# Patient Record
(demographics unavailable — no encounter records)

---

## 2025-01-28 NOTE — PHYSICAL EXAM
[No Acute Distress] : no acute distress [Well Nourished] : well nourished [Well Developed] : well developed [Well-Appearing] : well-appearing [Normal Sclera/Conjunctiva] : normal sclera/conjunctiva [PERRL] : pupils equal round and reactive to light [EOMI] : extraocular movements intact [Normal Outer Ear/Nose] : the outer ears and nose were normal in appearance [Normal Oropharynx] : the oropharynx was normal [No JVD] : no jugular venous distention [No Lymphadenopathy] : no lymphadenopathy [Supple] : supple [Thyroid Normal, No Nodules] : the thyroid was normal and there were no nodules present [No Respiratory Distress] : no respiratory distress  [No Accessory Muscle Use] : no accessory muscle use [Clear to Auscultation] : lungs were clear to auscultation bilaterally [Normal Rate] : normal rate  [Regular Rhythm] : with a regular rhythm [Normal S1, S2] : normal S1 and S2 [No Murmur] : no murmur heard [No Carotid Bruits] : no carotid bruits [No Abdominal Bruit] : a ~M bruit was not heard ~T in the abdomen [No Varicosities] : no varicosities [Pedal Pulses Present] : the pedal pulses are present [No Edema] : there was no peripheral edema [No Palpable Aorta] : no palpable aorta [No Extremity Clubbing/Cyanosis] : no extremity clubbing/cyanosis [Soft] : abdomen soft [Non Tender] : non-tender [Non-distended] : non-distended [No Masses] : no abdominal mass palpated [No HSM] : no HSM [Normal Bowel Sounds] : normal bowel sounds [Normal Posterior Cervical Nodes] : no posterior cervical lymphadenopathy [Normal Anterior Cervical Nodes] : no anterior cervical lymphadenopathy [No CVA Tenderness] : no CVA  tenderness [No Spinal Tenderness] : no spinal tenderness [No Joint Swelling] : no joint swelling [Grossly Normal Strength/Tone] : grossly normal strength/tone [No Rash] : no rash [Coordination Grossly Intact] : coordination grossly intact [No Focal Deficits] : no focal deficits [Normal Gait] : normal gait [Deep Tendon Reflexes (DTR)] : deep tendon reflexes were 2+ and symmetric [Normal Affect] : the affect was normal [Normal Insight/Judgement] : insight and judgment were intact [de-identified] : [place]; hearing aids [de-identified] : 1/3 objects remembered after 5 minutes

## 2025-01-28 NOTE — ASSESSMENT
[FreeTextEntry1] : 1) Memory loss, short-term likely age-related; only able to repeat 1/3 words after 5 minutes denies depression PHQ-2 = 1 (says she feels down about not being able to drive but otherwise enjoys seeing family and friends) continue to use hearing aids, see eye doctor check tsh, b12, folate does not want to see neurologist  2) H/o HLD, retinal artery occlusion OD has seen cardio Dr. Waller, wore Holter monitor for 2 weeks, daughter reports workup normal not on statin c/w ASA 81mg qd check lipid panel  3) Screening for osteoporosis 4) H/o OA  DEXA ordered; would consider medication if osteoporotic rarely takes tylenol prn for pain follows with Dr. Lamin lugo mgmt check 25-oh vit d  5) H/o Glaucoma, AMD f/u with ophtho Dr. Brandon c/w gtts c/w injections  6) H/o esophageal spasm occurs when not chewing food well takes Pepcid 20mg qd  7) H/o pancreatic head/neck junction side branch IPMN no longer following  8) HCM had flu shot, PNA does not want shingrix EKG obtained to assist in diagnosis and management of assessed problem(s). NSR rate 74 no ST changes no more mammo/pap/colonoscopy

## 2025-01-28 NOTE — HISTORY OF PRESENT ILLNESS
[Family Member] : family member [FreeTextEntry1] : establish care/AWV [de-identified] : 93 Y F with pmh of wet AMD, glaucoma, h/o uveitis s/p steroids, esophageal spasm, HLD, retinal artery occlusion OD, pancreatic side branch IPMN comes to office for physical/AWV and to establish care. Previous PMD Dr. Oviedo, sees pain mgmt Dr. Kimble, ophtho Dr. Brandon, cardio Dr. Waller [place]. Lives alone, but has strong family support. Gets injections in eyes with ophtho for AMD. Wears hearing aids. She used to get injections in knee/back, has had MILD procedure for lumbar spine, rarely takes pain meds. Her daughter reports short-term memory issues over past year. Patient sleeps about 7-7.5 hrs a night, sometimes takes a nap during day. No other complaints at this time.  No fevers/chills/CP/SOB/palpitations/leg swelling/abdominal pain/nausea/vomiting/diarrhea/recent illnesses/sick contacts.

## 2025-01-28 NOTE — HEALTH RISK ASSESSMENT
[Yes] : Yes [Monthly or less (1 pt)] : Monthly or less (1 point) [1 or 2 (0 pts)] : 1 or 2 (0 points) [Never (0 pts)] : Never (0 points) [No] : In the past 12 months have you used drugs other than those required for medical reasons? No [No falls in past year] : Patient reported no falls in the past year [Little interest or pleasure doing things] : 1) Little interest or pleasure doing things [0] : 1) Little interest or pleasure doing things: Not at all (0) [Feeling down, depressed, or hopeless] : 2) Feeling down, depressed, or hopeless [1] : 2) Feeling down, depressed, or hopeless for several days (1) [PHQ-2 Negative - No further assessment needed] : PHQ-2 Negative - No further assessment needed [Audit-CScore] : 1 [de-identified] : does exercises at park with senior group at least 1 a week  [de-identified] : B- oatmeal, raisin bran, fruit; L- goes out to eat; D- leftovers [[id number]] : 1 [Patient reported mammogram was normal] : Patient reported mammogram was normal [Patient declined PAP Smear] : Patient declined PAP Smear [Patient declined bone density test] : Patient declined bone density test [Patient declined colonoscopy] : Patient declined colonoscopy [HIV test declined] : HIV test declined [Hepatitis C test declined] : Hepatitis C test declined [Change in mental status noted] : Change in mental status noted [Language] : denies difficulty with language [Behavior] : denies difficulty with behavior [Learning/Retaining New Information] : difficulty learning/retaining new information [Handling Complex Tasks] : denies difficulty handling complex tasks [Reasoning] : denies difficulty with reasoning [Spatial Ability and Orientation] : denies difficulty with spatial ability and orientation [Transportation] : transportation [Alone] : lives alone [Retired] : retired [] :  [# Of Children ___] : has [unfilled] children [Sexually Active] : not sexually active [High Risk Behavior] : no high risk behavior [Feels Safe at Home] : Feels safe at home [Fully functional (bathing, dressing, toileting, transferring, walking, feeding)] : Fully functional (bathing, dressing, toileting, transferring, walking, feeding) [Reports changes in hearing] : Reports changes in hearing [Reports changes in vision] : Reports changes in vision [Reports normal functional visual acuity (ie: able to read med bottle)] : Reports normal functional visual acuity [Reports changes in dental health] : Reports no changes in dental health [MammogramDate] : 01/17 [MammogramComments] : no more [BoneDensityComments] : ordered dexa [de-identified] : short term [de-identified] : unable to drive or shop rest can do  [de-identified] : has hearing aids  [de-identified] : sees Dr. Brandon  [With Patient/Caregiver] : , with patient/caregiver [Designated Healthcare Proxy] : Designated healthcare proxy [Name: ___] : Health Care Proxy's Name: [unfilled]  [Relationship: ___] : Relationship: [unfilled] [I will adhere to the patient's wishes.] : I will adhere to the patient's wishes. [AdvancecareDate] : 01/25 [FreeTextEntry4] : patient has hcp form at home [Never] : Never [NO] : No

## 2025-01-28 NOTE — REVIEW OF SYSTEMS
[Joint Pain] : joint pain [Back Pain] : back pain [Memory Loss] : memory loss [Negative] : Heme/Lymph

## 2025-03-18 NOTE — HISTORY OF PRESENT ILLNESS
[Family Member] : family member [FreeTextEntry1] : prolia injection, repeat UA [de-identified] : Pt comes in for first prolia injection and repeat UA. No complaints at this time.

## 2025-03-18 NOTE — ASSESSMENT
[FreeTextEntry1] : prolia injection SQ LUE NDC [id number] LOT [id number] EXP 07/31/2027 60mg (not buy and bill patient's medication was sent to office from [facility] pharmacy)  repeat UA with microscopy for microscopic hematuria

## 2025-05-10 NOTE — PHYSICAL EXAM
[No Acute Distress] : no acute distress [Well Nourished] : well nourished [Well Developed] : well developed [Well-Appearing] : well-appearing [Normal Sclera/Conjunctiva] : normal sclera/conjunctiva [PERRL] : pupils equal round and reactive to light [EOMI] : extraocular movements intact [Normal Outer Ear/Nose] : the outer ears and nose were normal in appearance [Normal Oropharynx] : the oropharynx was normal [No JVD] : no jugular venous distention [No Lymphadenopathy] : no lymphadenopathy [Supple] : supple [Thyroid Normal, No Nodules] : the thyroid was normal and there were no nodules present [No Respiratory Distress] : no respiratory distress  [No Accessory Muscle Use] : no accessory muscle use [Regular Rhythm] : with a regular rhythm [Normal S1, S2] : normal S1 and S2 [No Murmur] : no murmur heard [No Carotid Bruits] : no carotid bruits [No Abdominal Bruit] : a ~M bruit was not heard ~T in the abdomen [No Varicosities] : no varicosities [Pedal Pulses Present] : the pedal pulses are present [No Palpable Aorta] : no palpable aorta [No Edema] : there was no peripheral edema [No Extremity Clubbing/Cyanosis] : no extremity clubbing/cyanosis [Soft] : abdomen soft [Non Tender] : non-tender [Non-distended] : non-distended [No Masses] : no abdominal mass palpated [No HSM] : no HSM [Normal Bowel Sounds] : normal bowel sounds [Normal Posterior Cervical Nodes] : no posterior cervical lymphadenopathy [Normal Anterior Cervical Nodes] : no anterior cervical lymphadenopathy [No CVA Tenderness] : no CVA  tenderness [No Spinal Tenderness] : no spinal tenderness [No Joint Swelling] : no joint swelling [Grossly Normal Strength/Tone] : grossly normal strength/tone [No Rash] : no rash [Coordination Grossly Intact] : coordination grossly intact [No Focal Deficits] : no focal deficits [Normal Gait] : normal gait [Deep Tendon Reflexes (DTR)] : deep tendon reflexes were 2+ and symmetric [Normal Affect] : the affect was normal [Normal Insight/Judgement] : insight and judgment were intact [de-identified] : coughing [de-identified] : mild rhonchi bilaterally [de-identified] : tachycardia

## 2025-05-10 NOTE — REVIEW OF SYSTEMS
[Fever] : no fever [Chills] : no chills [Fatigue] : fatigue [Hot Flashes] : no hot flashes [Night Sweats] : no night sweats [Recent Change In Weight] : ~T no recent weight change [Shortness Of Breath] : no shortness of breath [Wheezing] : no wheezing [Cough] : cough [Dyspnea on Exertion] : no dyspnea on exertion [Negative] : Heme/Lymph

## 2025-05-10 NOTE — HISTORY OF PRESENT ILLNESS
[FreeTextEntry8] : Pt comes in for 1 month of URI symptoms. Accompanied by daughter Gricel who gave most of the history since patient very hard of hearing and forgetful. Went to  on 4/19 for 5 days of cough and chest congestion, tested negative for covid, given z pack and tessalon perles. Says she got better but never 100%, by Wednesday of this week had worsening cough, chest congestion, and feeling very fatigued. Says cough is worse with lying down, mostly dry but occasionally small amount of clear phlegm. No overt SOB but just tired. No leg swelling. No fevers/chills. No sore throat/nasal congestion.  [Family Member] : family member

## 2025-07-30 NOTE — HISTORY OF PRESENT ILLNESS
[FreeTextEntry8] : Patient comes to office for burning on urination, lower abdominal discomfort. Accompanied by her daughter Gricel. Patient very hard of hearing and poor historian so history obtained from daughter Gricel. Says this weekend was in car to [place] for a while, didn't drink much water and didn't urinate a lot. Monday night started complaining of "pressure in bladder", lower abdominal tenderness, and frequent urination. Daughter gave her Azo and cranberry juice and had her drink lots of water. No nausea/vomiting/diarrhea/fevers/chills/flank pain. No blood in urine, change in urine composition. No vaginal discharge. Still having dysuria/frequency. Of note, patient recently started on Aricept.

## 2025-07-30 NOTE — PHYSICAL EXAM
[No Acute Distress] : no acute distress [Well Nourished] : well nourished [Well Developed] : well developed [Well-Appearing] : well-appearing [Normal Sclera/Conjunctiva] : normal sclera/conjunctiva [PERRL] : pupils equal round and reactive to light [EOMI] : extraocular movements intact [Normal Outer Ear/Nose] : the outer ears and nose were normal in appearance [Normal Oropharynx] : the oropharynx was normal [No JVD] : no jugular venous distention [No Lymphadenopathy] : no lymphadenopathy [Supple] : supple [Thyroid Normal, No Nodules] : the thyroid was normal and there were no nodules present [No Respiratory Distress] : no respiratory distress  [No Accessory Muscle Use] : no accessory muscle use [Clear to Auscultation] : lungs were clear to auscultation bilaterally [Normal Rate] : normal rate  [Regular Rhythm] : with a regular rhythm [Normal S1, S2] : normal S1 and S2 [No Murmur] : no murmur heard [No Carotid Bruits] : no carotid bruits [No Abdominal Bruit] : a ~M bruit was not heard ~T in the abdomen [No Varicosities] : no varicosities [Pedal Pulses Present] : the pedal pulses are present [No Edema] : there was no peripheral edema [No Palpable Aorta] : no palpable aorta [No Extremity Clubbing/Cyanosis] : no extremity clubbing/cyanosis [Soft] : abdomen soft [Non-distended] : non-distended [No Masses] : no abdominal mass palpated [No HSM] : no HSM [Normal Bowel Sounds] : normal bowel sounds [Normal Posterior Cervical Nodes] : no posterior cervical lymphadenopathy [Normal Anterior Cervical Nodes] : no anterior cervical lymphadenopathy [No CVA Tenderness] : no CVA  tenderness [No Spinal Tenderness] : no spinal tenderness [No Joint Swelling] : no joint swelling [Grossly Normal Strength/Tone] : grossly normal strength/tone [No Rash] : no rash [Coordination Grossly Intact] : coordination grossly intact [No Focal Deficits] : no focal deficits [Normal Gait] : normal gait [Deep Tendon Reflexes (DTR)] : deep tendon reflexes were 2+ and symmetric [Normal Affect] : the affect was normal [Normal Insight/Judgement] : insight and judgment were intact [de-identified] : very hard of hearing [de-identified] : mild suprapubic ttp no rebound/guarding

## 2025-07-30 NOTE — ASSESSMENT
[FreeTextEntry1] : T 98F P 89 S 98% on RA at rest P 136/78 UA dip trace blood small leuk est neg nitrities will send UCx and cover empirically with Ceftin 250mg bid for 5-7 days  if not UTI could be A/E of Aricept?  call if no improvement